# Patient Record
Sex: MALE | Race: WHITE | NOT HISPANIC OR LATINO
[De-identification: names, ages, dates, MRNs, and addresses within clinical notes are randomized per-mention and may not be internally consistent; named-entity substitution may affect disease eponyms.]

---

## 2017-09-19 ENCOUNTER — APPOINTMENT (OUTPATIENT)
Dept: PHYSICAL MEDICINE AND REHAB | Facility: CLINIC | Age: 72
End: 2017-09-19
Payer: MEDICARE

## 2017-09-19 VITALS
BODY MASS INDEX: 22.75 KG/M2 | WEIGHT: 168 LBS | OXYGEN SATURATION: 99 % | TEMPERATURE: 97.6 F | HEIGHT: 72 IN | SYSTOLIC BLOOD PRESSURE: 164 MMHG | DIASTOLIC BLOOD PRESSURE: 85 MMHG | HEART RATE: 63 BPM

## 2017-09-19 DIAGNOSIS — Z80.9 FAMILY HISTORY OF MALIGNANT NEOPLASM, UNSPECIFIED: ICD-10-CM

## 2017-09-19 PROCEDURE — 99204 OFFICE O/P NEW MOD 45 MIN: CPT

## 2017-09-21 PROBLEM — Z80.9 FAMILY HISTORY OF CANCER: Status: ACTIVE | Noted: 2017-09-21

## 2018-08-16 ENCOUNTER — APPOINTMENT (OUTPATIENT)
Dept: OTOLARYNGOLOGY | Facility: CLINIC | Age: 73
End: 2018-08-16
Payer: MEDICARE

## 2018-08-16 VITALS
HEART RATE: 76 BPM | BODY MASS INDEX: 23.03 KG/M2 | WEIGHT: 170 LBS | SYSTOLIC BLOOD PRESSURE: 142 MMHG | DIASTOLIC BLOOD PRESSURE: 82 MMHG | HEIGHT: 72 IN | TEMPERATURE: 98.2 F | OXYGEN SATURATION: 97 %

## 2018-08-16 DIAGNOSIS — H53.19 OTHER SUBJECTIVE VISUAL DISTURBANCES: ICD-10-CM

## 2018-08-16 DIAGNOSIS — Z78.9 OTHER SPECIFIED HEALTH STATUS: ICD-10-CM

## 2018-08-16 PROCEDURE — 99204 OFFICE O/P NEW MOD 45 MIN: CPT

## 2018-08-16 RX ORDER — MULTIVITAMIN
TABLET ORAL
Refills: 0 | Status: ACTIVE | COMMUNITY

## 2018-08-16 RX ORDER — MELOXICAM 7.5 MG/1
7.5 TABLET ORAL TWICE DAILY
Qty: 28 | Refills: 0 | Status: DISCONTINUED | COMMUNITY
Start: 2017-09-19 | End: 2018-08-16

## 2019-06-28 ENCOUNTER — OUTPATIENT (OUTPATIENT)
Dept: OUTPATIENT SERVICES | Facility: HOSPITAL | Age: 74
LOS: 1 days | Discharge: HOME | End: 2019-06-28

## 2019-06-28 DIAGNOSIS — D47.3 ESSENTIAL (HEMORRHAGIC) THROMBOCYTHEMIA: ICD-10-CM

## 2019-07-10 ENCOUNTER — RESULT REVIEW (OUTPATIENT)
Age: 74
End: 2019-07-10

## 2019-07-10 ENCOUNTER — APPOINTMENT (OUTPATIENT)
Dept: HEMATOLOGY ONCOLOGY | Facility: CLINIC | Age: 74
End: 2019-07-10
Payer: MEDICARE

## 2019-07-10 VITALS
BODY MASS INDEX: 21.2 KG/M2 | HEART RATE: 62 BPM | HEIGHT: 72 IN | WEIGHT: 156.53 LBS | DIASTOLIC BLOOD PRESSURE: 85 MMHG | RESPIRATION RATE: 17 BRPM | OXYGEN SATURATION: 99 % | TEMPERATURE: 97.6 F | SYSTOLIC BLOOD PRESSURE: 160 MMHG

## 2019-07-10 LAB
BASOPHILS # BLD AUTO: 0 K/UL — SIGNIFICANT CHANGE UP (ref 0–0.2)
BASOPHILS NFR BLD AUTO: 0 % — SIGNIFICANT CHANGE UP (ref 0–2)
EOSINOPHIL # BLD AUTO: 0.1 K/UL — SIGNIFICANT CHANGE UP (ref 0–0.5)
EOSINOPHIL NFR BLD AUTO: 1.2 % — SIGNIFICANT CHANGE UP (ref 0–6)
HCT VFR BLD CALC: 40.6 % — SIGNIFICANT CHANGE UP (ref 39–50)
HGB BLD-MCNC: 13.7 G/DL — SIGNIFICANT CHANGE UP (ref 13–17)
LYMPHOCYTES # BLD AUTO: 1.3 K/UL — SIGNIFICANT CHANGE UP (ref 1–3.3)
LYMPHOCYTES # BLD AUTO: 23.7 % — SIGNIFICANT CHANGE UP (ref 13–44)
MCHC RBC-ENTMCNC: 33.8 G/DL — SIGNIFICANT CHANGE UP (ref 32–36)
MCHC RBC-ENTMCNC: 35.6 PG — HIGH (ref 27–34)
MCV RBC AUTO: 105 FL — HIGH (ref 80–100)
MONOCYTES # BLD AUTO: 0.7 K/UL — SIGNIFICANT CHANGE UP (ref 0–0.9)
MONOCYTES NFR BLD AUTO: 13.8 % — SIGNIFICANT CHANGE UP (ref 2–14)
NEUTROPHILS # BLD AUTO: 3.3 K/UL — SIGNIFICANT CHANGE UP (ref 1.8–7.4)
NEUTROPHILS NFR BLD AUTO: 61.4 % — SIGNIFICANT CHANGE UP (ref 43–77)
PLATELET # BLD AUTO: 528 K/UL — HIGH (ref 150–400)
RBC # BLD: 3.85 M/UL — LOW (ref 4.2–5.8)
RBC # FLD: 24.1 % — HIGH (ref 10.3–14.5)
WBC # BLD: 5.3 K/UL — SIGNIFICANT CHANGE UP (ref 3.8–10.5)
WBC # FLD AUTO: 5.3 K/UL — SIGNIFICANT CHANGE UP (ref 3.8–10.5)

## 2019-07-10 PROCEDURE — 99204 OFFICE O/P NEW MOD 45 MIN: CPT

## 2019-07-12 RX ORDER — ATORVASTATIN CALCIUM 40 MG/1
40 TABLET, FILM COATED ORAL
Qty: 1 | Refills: 1 | Status: ACTIVE | COMMUNITY
Start: 2019-07-12

## 2019-07-12 NOTE — REVIEW OF SYSTEMS
[Negative] : Heme/Lymph [FreeTextEntry5] : Chest paisn now resolved since May.  [de-identified] : rash almost gone/

## 2019-07-12 NOTE — ASSESSMENT
[FreeTextEntry1] : This is a 73 year old man with a history of CAD recent stent placement at [facility]. On that hospital stay platelet count found to be over 1,000,000.  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  Patient now feeling well. Dose of the Hydroxyuria was being adjusted a lot lately (has only been on this for 2 months).  Most recent platelet count at [facility] this past week was 430,000 dose increased to 4 tablets on weekdays, 1 tablet on weekends.  He started this just yesterday.  Platelet count 528,000 today. Recommend he keep the current schedule, there was not enough time on new treatment dose to have made an impact yet. Recheck in a week.  \par \par Informed patient etiology and pathophysiology behind the ANNA 2 mutaiton. We discussed the risk of this progressing into myelofibrosis and AML.  Explained that the purpose of the hydrea is to limit the thrombotic risk fro the platelet elevation, does not prevent the progression into a more insidious state.  \par \par Spent > 45 minutes in direct patient care and and addressed all questions and concerns.  >50% of this time was in direct face to face contact with patient during exam and counseling.

## 2019-07-12 NOTE — HISTORY OF PRESENT ILLNESS
[de-identified] : This is a 73 year old man with a history of ANNA 2 positive ET.  Patient was being treated with Hydroxyuria 2 500mg initially tablets daily starting 2 months ago by Hematology Dr. Villanueva at [facility]. May 3 until May 18.  Has been treated with hydroxyuria for about 2 months.  The dose had been changed quite a few times first because dose was too high and platelets down in the 100,000 range, more recently platelets too high and most recent increase was to  weekdays 2 tablets BId and then 1 tablet on the weekends. . \par He does describe a erythematous rash Little bit of a rash that went away shortly after treatment.  During this hospital stay at [facility] may 2 stents placed.  Hx of stroke while having the cardiac cath. \par hx of adhesive allergy to bandages.

## 2019-07-25 ENCOUNTER — TRANSCRIPTION ENCOUNTER (OUTPATIENT)
Age: 74
End: 2019-07-25

## 2019-08-07 ENCOUNTER — OUTPATIENT (OUTPATIENT)
Dept: OUTPATIENT SERVICES | Facility: HOSPITAL | Age: 74
LOS: 1 days | Discharge: HOME | End: 2019-08-07

## 2019-08-07 DIAGNOSIS — D47.3 ESSENTIAL (HEMORRHAGIC) THROMBOCYTHEMIA: ICD-10-CM

## 2019-08-09 ENCOUNTER — APPOINTMENT (OUTPATIENT)
Dept: HEMATOLOGY ONCOLOGY | Facility: CLINIC | Age: 74
End: 2019-08-09

## 2019-09-18 ENCOUNTER — APPOINTMENT (OUTPATIENT)
Dept: FAMILY MEDICINE | Facility: CLINIC | Age: 74
End: 2019-09-18
Payer: MEDICARE

## 2019-09-18 VITALS
TEMPERATURE: 98 F | WEIGHT: 164.19 LBS | DIASTOLIC BLOOD PRESSURE: 72 MMHG | HEIGHT: 72 IN | BODY MASS INDEX: 22.24 KG/M2 | OXYGEN SATURATION: 99 % | SYSTOLIC BLOOD PRESSURE: 110 MMHG | RESPIRATION RATE: 14 BRPM | HEART RATE: 64 BPM

## 2019-09-18 DIAGNOSIS — Z92.89 PERSONAL HISTORY OF OTHER MEDICAL TREATMENT: ICD-10-CM

## 2019-09-18 DIAGNOSIS — Z87.898 PERSONAL HISTORY OF OTHER SPECIFIED CONDITIONS: ICD-10-CM

## 2019-09-18 DIAGNOSIS — Z80.3 FAMILY HISTORY OF MALIGNANT NEOPLASM OF BREAST: ICD-10-CM

## 2019-09-18 DIAGNOSIS — Z86.73 PERSONAL HISTORY OF TRANSIENT ISCHEMIC ATTACK (TIA), AND CEREBRAL INFARCTION W/OUT RESIDUAL DEFICITS: ICD-10-CM

## 2019-09-18 DIAGNOSIS — Z80.42 FAMILY HISTORY OF MALIGNANT NEOPLASM OF PROSTATE: ICD-10-CM

## 2019-09-18 DIAGNOSIS — Z80.8 FAMILY HISTORY OF MALIGNANT NEOPLASM OF OTHER ORGANS OR SYSTEMS: ICD-10-CM

## 2019-09-18 DIAGNOSIS — I25.2 OLD MYOCARDIAL INFARCTION: ICD-10-CM

## 2019-09-18 PROCEDURE — 99204 OFFICE O/P NEW MOD 45 MIN: CPT

## 2019-09-18 NOTE — PHYSICAL EXAM
[No Acute Distress] : no acute distress [Well Nourished] : well nourished [Well-Appearing] : well-appearing [Well Developed] : well developed [Normal Voice/Communication] : normal voice/communication [Normal Sclera/Conjunctiva] : normal sclera/conjunctiva [EOMI] : extraocular movements intact [Normal Outer Ear/Nose] : the outer ears and nose were normal in appearance [No JVD] : no jugular venous distention [Thyroid Normal, No Nodules] : the thyroid was normal and there were no nodules present [No Accessory Muscle Use] : no accessory muscle use [No Respiratory Distress] : no respiratory distress  [Clear to Auscultation] : lungs were clear to auscultation bilaterally [Normal Rate] : normal rate  [Normal S1, S2] : normal S1 and S2 [Regular Rhythm] : with a regular rhythm [Pedal Pulses Present] : the pedal pulses are present [No Edema] : there was no peripheral edema [No Extremity Clubbing/Cyanosis] : no extremity clubbing/cyanosis [No Masses] : no abdominal mass palpated [Normal Supraclavicular Nodes] : no supraclavicular lymphadenopathy [Normal Anterior Cervical Nodes] : no anterior cervical lymphadenopathy [Grossly Normal Strength/Tone] : grossly normal strength/tone [Coordination Grossly Intact] : coordination grossly intact [No Focal Deficits] : no focal deficits [Normal Gait] : normal gait [Speech Grossly Normal] : speech grossly normal [Memory Grossly Normal] : memory grossly normal [Normal Affect] : the affect was normal [Alert and Oriented x3] : oriented to person, place, and time [Normal Mood] : the mood was normal [Normal Insight/Judgement] : insight and judgment were intact

## 2019-09-18 NOTE — HISTORY OF PRESENT ILLNESS
[FreeTextEntry8] : Patient presents for initial visit. Feels well, +PMH of thrombocytosis. Colonoscopy referral discussed today, patient declines at this time. PSA was tested 2 weeks ago, was 0.2, patient declines retesting in blood work ordered today. \par \par Dr. Aragon cardio at [place]\par Dr. 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

## 2019-09-20 ENCOUNTER — APPOINTMENT (OUTPATIENT)
Dept: FAMILY MEDICINE | Facility: CLINIC | Age: 74
End: 2019-09-20
Payer: MEDICARE

## 2019-09-20 VITALS
SYSTOLIC BLOOD PRESSURE: 122 MMHG | HEART RATE: 61 BPM | WEIGHT: 164 LBS | TEMPERATURE: 98.1 F | DIASTOLIC BLOOD PRESSURE: 68 MMHG | OXYGEN SATURATION: 100 % | RESPIRATION RATE: 16 BRPM | BODY MASS INDEX: 22.21 KG/M2 | HEIGHT: 72 IN

## 2019-09-20 DIAGNOSIS — S90.859A SUPERFICIAL FOREIGN BODY, UNSPECIFIED FOOT, INITIAL ENCOUNTER: ICD-10-CM

## 2019-09-20 PROCEDURE — 99214 OFFICE O/P EST MOD 30 MIN: CPT

## 2019-09-20 NOTE — ASSESSMENT
[FreeTextEntry1] : splinter removed with tweezers in office, small amount of purulent drainage, site cleansed with alcohol prep pad prior to extraction and cleaned and dressed with triple antibiotic ointment and band aid. \par keep clean and covered with antibiotic ointment and band aid until closed\par Return to office if you feel worse or do not feel better\par

## 2019-09-20 NOTE — PHYSICAL EXAM
[No Acute Distress] : no acute distress [Well Nourished] : well nourished [Well Developed] : well developed [Normal Voice/Communication] : normal voice/communication [Well-Appearing] : well-appearing [Normal Sclera/Conjunctiva] : normal sclera/conjunctiva [Normal Outer Ear/Nose] : the outer ears and nose were normal in appearance [de-identified] : left foot: slight bluish discoloration of distal foot/toes ?Raynaud's phenomenon: splinter removed, small amount of pus expressed, no pain, no bleeding

## 2019-09-20 NOTE — HISTORY OF PRESENT ILLNESS
[FreeTextEntry8] : Patient presents c/o ?splinter in left foot on plantar surface just below second metatarsal. x 1 day.  Painful with walking a little, no fever, no redness. \par \par

## 2019-10-04 ENCOUNTER — MOBILE ON CALL (OUTPATIENT)
Age: 74
End: 2019-10-04

## 2019-10-07 ENCOUNTER — APPOINTMENT (OUTPATIENT)
Dept: FAMILY MEDICINE | Facility: CLINIC | Age: 74
End: 2019-10-07
Payer: MEDICARE

## 2019-10-07 VITALS
DIASTOLIC BLOOD PRESSURE: 72 MMHG | TEMPERATURE: 98.5 F | OXYGEN SATURATION: 99 % | RESPIRATION RATE: 14 BRPM | BODY MASS INDEX: 21.55 KG/M2 | WEIGHT: 159.13 LBS | SYSTOLIC BLOOD PRESSURE: 104 MMHG | HEIGHT: 72 IN | HEART RATE: 67 BPM

## 2019-10-07 DIAGNOSIS — J06.9 ACUTE UPPER RESPIRATORY INFECTION, UNSPECIFIED: ICD-10-CM

## 2019-10-07 PROCEDURE — 99214 OFFICE O/P EST MOD 30 MIN: CPT

## 2019-10-10 ENCOUNTER — APPOINTMENT (OUTPATIENT)
Dept: OTOLARYNGOLOGY | Facility: CLINIC | Age: 74
End: 2019-10-10
Payer: MEDICARE

## 2019-10-10 VITALS
HEIGHT: 72 IN | WEIGHT: 164 LBS | OXYGEN SATURATION: 98 % | TEMPERATURE: 97.5 F | BODY MASS INDEX: 22.21 KG/M2 | SYSTOLIC BLOOD PRESSURE: 112 MMHG | DIASTOLIC BLOOD PRESSURE: 74 MMHG | HEART RATE: 69 BPM

## 2019-10-10 DIAGNOSIS — H91.90 UNSPECIFIED HEARING LOSS, UNSPECIFIED EAR: ICD-10-CM

## 2019-10-10 DIAGNOSIS — Z15.89 GENETIC SUSCEPTIBILITY TO OTHER DISEASE: ICD-10-CM

## 2019-10-10 DIAGNOSIS — H93.229: ICD-10-CM

## 2019-10-10 PROCEDURE — 99213 OFFICE O/P EST LOW 20 MIN: CPT

## 2019-10-10 RX ORDER — TRIAMCINOLONE ACETONIDE 1 MG/G
0.1 CREAM TOPICAL
Qty: 454 | Refills: 0 | Status: COMPLETED | COMMUNITY
Start: 2019-08-05

## 2019-10-10 RX ORDER — AMLODIPINE BESYLATE 5 MG/1
5 TABLET ORAL
Refills: 0 | Status: DISCONTINUED | COMMUNITY
End: 2019-10-10

## 2019-10-10 RX ORDER — PEDI NUTRITION,MILK BASED,IRON 0.03 G-0.6
LIQUID (ML) ORAL
Refills: 0 | Status: DISCONTINUED | COMMUNITY
End: 2019-10-10

## 2019-10-10 RX ORDER — ASPIRIN 325 MG/1
325 TABLET, COATED ORAL DAILY
Qty: 90 | Refills: 3 | Status: DISCONTINUED | COMMUNITY
Start: 2019-07-12 | End: 2019-10-10

## 2019-10-10 RX ORDER — ASPIRIN ENTERIC COATED TABLETS 81 MG 81 MG/1
81 TABLET, DELAYED RELEASE ORAL
Refills: 0 | Status: ACTIVE | COMMUNITY

## 2019-10-10 RX ORDER — CARVEDILOL 3.12 MG/1
3.12 TABLET, FILM COATED ORAL
Qty: 180 | Refills: 2 | Status: COMPLETED | COMMUNITY
Start: 2019-07-12 | End: 2019-10-10

## 2019-10-10 RX ORDER — PANTOPRAZOLE 40 MG/1
40 TABLET, DELAYED RELEASE ORAL
Qty: 30 | Refills: 0 | Status: COMPLETED | COMMUNITY
Start: 2019-05-18

## 2019-10-10 RX ORDER — RAMIPRIL 2.5 MG/1
2.5 CAPSULE ORAL
Qty: 90 | Refills: 0 | Status: COMPLETED | COMMUNITY
Start: 2019-09-12

## 2019-10-10 RX ORDER — UBIDECARENONE/VIT E ACET 100MG-5
CAPSULE ORAL
Refills: 0 | Status: DISCONTINUED | COMMUNITY
End: 2019-10-10

## 2019-10-10 NOTE — PHYSICAL EXAM
[Midline] : trachea located in midline position [Normal] : no rashes [] : [place]-Hallpike test is negative

## 2019-10-10 NOTE — HISTORY OF PRESENT ILLNESS
[de-identified] : 73 y/o M who continues to have an echo sensation in both ears when he exercises, worse in the right ear.

## 2019-10-10 NOTE — CONSULT LETTER
[Courtesy Letter:] : I had the pleasure of seeing your patient, [unfilled], in my office today. [Dear  ___] : Dear  [unfilled], [Please see my note below.] : Please see my note below. [Consult Closing:] : Thank you very much for allowing me to participate in the care of this patient.  If you have any questions, please do not hesitate to contact me. [Sincerely,] : Sincerely, [FreeTextEntry3] : Camden Boone MD, FACS\par Clinical \par Dept. of Otolaryngology\par [facility]\par  [FreeTextEntry2] : Joey Santana MD

## 2019-10-19 NOTE — ASSESSMENT
[FreeTextEntry1] : rest, plenty of fluids\par supportive care\par flonase, claritin if needed\par Return to office if you feel worse or do not feel better\par

## 2019-10-19 NOTE — HISTORY OF PRESENT ILLNESS
[FreeTextEntry8] : Since Thursday, dry cough. Runny nose, dizziness when got up quickly, achy, chills +hot and cold. No sick contacts. No n/v/d. No fever measured at home. Feels slightly better every day since then. Thought was better, then last evening felt worse again.  Low appetite past few days, "came back" today. Normal food tolerated today. \par \par \par ROS: negative except as noted above\par \par

## 2019-10-19 NOTE — PHYSICAL EXAM
[No Acute Distress] : no acute distress [Well Nourished] : well nourished [Well-Appearing] : well-appearing [Well Developed] : well developed [Normal Voice/Communication] : normal voice/communication [Normal TMs] : both tympanic membranes were normal [Normal Outer Ear/Nose] : the outer ears and nose were normal in appearance [Normal Sclera/Conjunctiva] : normal sclera/conjunctiva [No JVD] : no jugular venous distention [No Respiratory Distress] : no respiratory distress  [No Accessory Muscle Use] : no accessory muscle use [Clear to Auscultation] : lungs were clear to auscultation bilaterally [Normal Rate] : normal rate  [Regular Rhythm] : with a regular rhythm [Normal S1, S2] : normal S1 and S2 [Normal Supraclavicular Nodes] : no supraclavicular lymphadenopathy [Normal Anterior Cervical Nodes] : no anterior cervical lymphadenopathy [Coordination Grossly Intact] : coordination grossly intact [No Rash] : no rash [Normal Gait] : normal gait [No Focal Deficits] : no focal deficits [Speech Grossly Normal] : speech grossly normal [Normal Affect] : the affect was normal [Memory Grossly Normal] : memory grossly normal [Normal Mood] : the mood was normal [Alert and Oriented x3] : oriented to person, place, and time [Normal Insight/Judgement] : insight and judgment were intact [de-identified] : sl erythema o/p, no tonsillar exudates

## 2019-11-05 ENCOUNTER — APPOINTMENT (OUTPATIENT)
Dept: OTOLARYNGOLOGY | Facility: CLINIC | Age: 74
End: 2019-11-05

## 2019-11-26 ENCOUNTER — RESULT REVIEW (OUTPATIENT)
Age: 74
End: 2019-11-26

## 2020-01-03 ENCOUNTER — APPOINTMENT (OUTPATIENT)
Dept: OTOLARYNGOLOGY | Facility: CLINIC | Age: 75
End: 2020-01-03
Payer: MEDICARE

## 2020-01-03 DIAGNOSIS — H93.293 OTHER ABNORMAL AUDITORY PERCEPTIONS, BILATERAL: ICD-10-CM

## 2020-01-03 DIAGNOSIS — H90.3 SENSORINEURAL HEARING LOSS, BILATERAL: ICD-10-CM

## 2020-01-03 PROCEDURE — 99214 OFFICE O/P EST MOD 30 MIN: CPT | Mod: 25

## 2020-01-03 PROCEDURE — 92504 EAR MICROSCOPY EXAMINATION: CPT

## 2020-01-03 PROCEDURE — 92567 TYMPANOMETRY: CPT | Mod: 22

## 2020-01-03 NOTE — PHYSICAL EXAM
[Normal] : no rashes [de-identified] : no visible respirations on deep inspiration when supine or upright

## 2020-01-03 NOTE — HISTORY OF PRESENT ILLNESS
[de-identified] : 74 year old male internal referral by Dr. Camden Boone, ENT for echoing. 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 Usually right ear but sometimes the left ear.  Reports bilateral ear echo worse in the right ear. Reports it goes away when laying down. Reports echoing began about 3 years ago, is intermittent, but seems to be occurring more often. Initially would start if he was outside with strong winds, now it occurs at random. Reports intermittent bilateral ears tinnitus, non fluctuating, described as high pitched, noticed when he became an . Denies otalgia, otorrhea, ear infections. Denies hearing loss. Denies use of hearing aids.  Lost about 10 pounds last spring while in hospital but has since gained it back, symptoms present before then.  No prior ear surgeries.  Takes meds for BP/CAD, sometimes gets dizziness for this.  , retired since age 60.  Previously worked for [employer].  No ETD problems when flying.

## 2020-01-03 NOTE — PROCEDURE
[FreeTextEntry3] : Procedure note:  Binocular microscopy\par \par Jan 03, 2020 \par \par Inspection of the ears was performed under binocular microscopy because of need to accurately visualize otologic landmarks and potential pathologic conditions that would not otherwise be visible through simple otoscopic exam.\par

## 2020-01-03 NOTE — DATA REVIEWED
[de-identified] : I personally reviewed the patient's audiogram, which shows no change in compliance on long-time tympanometry with deep inspiration.\par

## 2020-01-03 NOTE — REASON FOR VISIT
[Initial Consultation] : an initial consultation for [Other: _____] : [unfilled] [FreeTextEntry2] : internal referral by Dr. Camden Boone, ENT for autophony

## 2020-03-04 ENCOUNTER — APPOINTMENT (OUTPATIENT)
Dept: FAMILY MEDICINE | Facility: CLINIC | Age: 75
End: 2020-03-04
Payer: MEDICARE

## 2020-03-04 VITALS
HEART RATE: 59 BPM | BODY MASS INDEX: 22.53 KG/M2 | SYSTOLIC BLOOD PRESSURE: 100 MMHG | HEIGHT: 72 IN | OXYGEN SATURATION: 99 % | WEIGHT: 166.31 LBS | DIASTOLIC BLOOD PRESSURE: 70 MMHG | RESPIRATION RATE: 16 BRPM

## 2020-03-04 PROCEDURE — 99214 OFFICE O/P EST MOD 30 MIN: CPT

## 2020-03-04 NOTE — ASSESSMENT
[FreeTextEntry1] : autophony - flonase x 2-4 weeks twice daily\par Return to office if you feel worse or do not feel better\par RTO 1 month for f/u with me will discuss CT

## 2020-03-04 NOTE — PHYSICAL EXAM
[No Acute Distress] : no acute distress [Well Nourished] : well nourished [Well Developed] : well developed [Well-Appearing] : well-appearing [Normal Voice/Communication] : normal voice/communication [Normal Sclera/Conjunctiva] : normal sclera/conjunctiva [PERRL] : pupils equal round and reactive to light [Normal Outer Ear/Nose] : the outer ears and nose were normal in appearance [Normal Oropharynx] : the oropharynx was normal [Normal TMs] : both tympanic membranes were normal [No JVD] : no jugular venous distention [No Lymphadenopathy] : no lymphadenopathy [Supple] : supple [No Respiratory Distress] : no respiratory distress  [No Accessory Muscle Use] : no accessory muscle use [Clear to Auscultation] : lungs were clear to auscultation bilaterally [Normal Rate] : normal rate  [Regular Rhythm] : with a regular rhythm [Normal S1, S2] : normal S1 and S2 [Normal Supraclavicular Nodes] : no supraclavicular lymphadenopathy [Normal Anterior Cervical Nodes] : no anterior cervical lymphadenopathy [Coordination Grossly Intact] : coordination grossly intact [No Focal Deficits] : no focal deficits [Normal Gait] : normal gait [Speech Grossly Normal] : speech grossly normal [Memory Grossly Normal] : memory grossly normal [Normal Affect] : the affect was normal [Alert and Oriented x3] : oriented to person, place, and time [Normal Mood] : the mood was normal [Normal Insight/Judgement] : insight and judgment were intact [de-identified] : nasal turbinates larger on right than left

## 2020-03-04 NOTE — HISTORY OF PRESENT ILLNESS
[FreeTextEntry8] : Patient presents c/o 1.5 years of an echo sound in right ear, sometimes in left ear, notices it especially at night. No ear pain. +h/o thrombocytopenia. + h/o prostate cancer, declines prostate blood work. today, states had recent testing that was within desired range as per his oncologist. Did see ENT for this complaint recently.\par \par ROS: negative except as noted above\par

## 2020-06-29 ENCOUNTER — APPOINTMENT (OUTPATIENT)
Dept: FAMILY MEDICINE | Facility: CLINIC | Age: 75
End: 2020-06-29
Payer: MEDICARE

## 2020-06-29 VITALS
BODY MASS INDEX: 22.24 KG/M2 | DIASTOLIC BLOOD PRESSURE: 70 MMHG | TEMPERATURE: 97.8 F | WEIGHT: 164 LBS | OXYGEN SATURATION: 99 % | SYSTOLIC BLOOD PRESSURE: 110 MMHG | HEART RATE: 60 BPM | RESPIRATION RATE: 14 BRPM

## 2020-06-29 DIAGNOSIS — D17.9 BENIGN LIPOMATOUS NEOPLASM, UNSPECIFIED: ICD-10-CM

## 2020-06-29 PROCEDURE — 99214 OFFICE O/P EST MOD 30 MIN: CPT

## 2020-06-29 NOTE — HISTORY OF PRESENT ILLNESS
[FreeTextEntry1] : Saw derm last week, had left shoulder skin-superficial melanoma. Left shoulder small lipoma, not painful. Just noticed it the other day. Headaches occasionally mild, do not wake him at night. Running for exercise and stress management, but prefers to go to gym due to weight loss with running. \par \par ROS: negative except as noted above\par  \par \par

## 2020-06-29 NOTE — PHYSICAL EXAM
[Well Nourished] : well nourished [No Acute Distress] : no acute distress [Well Developed] : well developed [Well-Appearing] : well-appearing [Normal Voice/Communication] : normal voice/communication [Normal Sclera/Conjunctiva] : normal sclera/conjunctiva [Normal Outer Ear/Nose] : the outer ears and nose were normal in appearance [No Respiratory Distress] : no respiratory distress  [Clear to Auscultation] : lungs were clear to auscultation bilaterally [No Accessory Muscle Use] : no accessory muscle use [Regular Rhythm] : with a regular rhythm [Normal Rate] : normal rate  [Normal S1, S2] : normal S1 and S2 [Normal Anterior Cervical Nodes] : no anterior cervical lymphadenopathy [Normal Supraclavicular Nodes] : no supraclavicular lymphadenopathy [Speech Grossly Normal] : speech grossly normal [Memory Grossly Normal] : memory grossly normal [Alert and Oriented x3] : oriented to person, place, and time [Normal Mood] : the mood was normal [Normal Affect] : the affect was normal [Normal Insight/Judgement] : insight and judgment were intact

## 2020-10-26 ENCOUNTER — RX RENEWAL (OUTPATIENT)
Age: 75
End: 2020-10-26

## 2020-10-30 ENCOUNTER — APPOINTMENT (OUTPATIENT)
Dept: FAMILY MEDICINE | Facility: CLINIC | Age: 75
End: 2020-10-30
Payer: MEDICARE

## 2020-10-30 VITALS
SYSTOLIC BLOOD PRESSURE: 126 MMHG | HEART RATE: 60 BPM | OXYGEN SATURATION: 99 % | TEMPERATURE: 96.6 F | BODY MASS INDEX: 22.79 KG/M2 | WEIGHT: 168 LBS | DIASTOLIC BLOOD PRESSURE: 82 MMHG | RESPIRATION RATE: 14 BRPM

## 2020-10-30 PROCEDURE — 99214 OFFICE O/P EST MOD 30 MIN: CPT

## 2020-10-30 PROCEDURE — 99072 ADDL SUPL MATRL&STAF TM PHE: CPT

## 2020-10-30 NOTE — PHYSICAL EXAM
[No Acute Distress] : no acute distress [Well Nourished] : well nourished [Well Developed] : well developed [Well-Appearing] : well-appearing [Normal Voice/Communication] : normal voice/communication [Normal Sclera/Conjunctiva] : normal sclera/conjunctiva [Normal Outer Ear/Nose] : the outer ears and nose were normal in appearance [No Lymphadenopathy] : no lymphadenopathy [Supple] : supple [No Respiratory Distress] : no respiratory distress  [No Accessory Muscle Use] : no accessory muscle use [Clear to Auscultation] : lungs were clear to auscultation bilaterally [Normal Rate] : normal rate  [Regular Rhythm] : with a regular rhythm [Normal S1, S2] : normal S1 and S2 [Speech Grossly Normal] : speech grossly normal [Memory Grossly Normal] : memory grossly normal [Normal Affect] : the affect was normal [Alert and Oriented x3] : oriented to person, place, and time [Normal Mood] : the mood was normal [Normal Insight/Judgement] : insight and judgment were intact

## 2020-11-02 DIAGNOSIS — Z00.00 ENCOUNTER FOR GENERAL ADULT MEDICAL EXAMINATION W/OUT ABNORMAL FINDINGS: ICD-10-CM

## 2020-11-02 RX ORDER — CARVEDILOL 25 MG/1
TABLET, FILM COATED ORAL
Refills: 0 | Status: COMPLETED | COMMUNITY
End: 2020-11-02

## 2020-11-02 RX ORDER — HYDROXYUREA 500 MG/1
CAPSULE ORAL
Refills: 0 | Status: COMPLETED | COMMUNITY
End: 2020-11-02

## 2020-11-02 RX ORDER — CANDESARTAN CILEXETIL 4 MG/1
4 TABLET ORAL
Qty: 90 | Refills: 0 | Status: COMPLETED | COMMUNITY
Start: 2019-10-03 | End: 2020-11-02

## 2020-11-02 RX ORDER — TICAGRELOR 90 MG/1
90 TABLET ORAL TWICE DAILY
Refills: 0 | Status: DISCONTINUED | COMMUNITY
Start: 2019-07-12 | End: 2020-11-02

## 2020-11-02 RX ORDER — CARVEDILOL 3.12 MG/1
3.12 TABLET, FILM COATED ORAL TWICE DAILY
Qty: 60 | Refills: 0 | Status: ACTIVE | COMMUNITY
Start: 2020-11-02

## 2020-11-02 RX ORDER — CANDESARTAN CILEXETIL 4 MG/1
4 TABLET ORAL AT BEDTIME
Qty: 30 | Refills: 0 | Status: ACTIVE | COMMUNITY
Start: 2020-11-02

## 2020-11-02 NOTE — HISTORY OF PRESENT ILLNESS
[FreeTextEntry1] : Dr. Villanueva hematology /oncology, affiliated with [facility]. Also cardiologist Dr. Lynch. Needs reports. Started going to the gym again. Feels well. NO headaches, no blurry vision, no epistaxis. Lower back pain, acupuncture helps. Better after moves around in the morning. \par \par ROS: negative except as noted above\par

## 2020-11-02 NOTE — ASSESSMENT
[FreeTextEntry1] : declines blood work\par last colonoscopy few polyps, declines further colonoscopy \par no change in bowel habits, no black or bloody stool, no family hx colon cancer\par states had psa 6 months ago, normal \par I d/w patient that I need records from other physicians to review blood work, cancer screenings etc. or would be best to repeat those tests with me. He declines blood work at this time\par RBA of not doing cancer screening tests discussed, need records please advised\par Return for CPE in 3 months and prn

## 2020-11-19 ENCOUNTER — RX CHANGE (OUTPATIENT)
Age: 75
End: 2020-11-19

## 2020-11-23 ENCOUNTER — RX CHANGE (OUTPATIENT)
Age: 75
End: 2020-11-23

## 2020-11-24 ENCOUNTER — RX CHANGE (OUTPATIENT)
Age: 75
End: 2020-11-24

## 2020-12-18 RX ORDER — HYDROXYUREA 500 MG/1
500 CAPSULE ORAL DAILY
Qty: 60 | Refills: 0 | Status: ACTIVE | COMMUNITY
Start: 2020-11-02 | End: 1900-01-01

## 2020-12-21 PROBLEM — J06.9 VIRAL URI: Status: RESOLVED | Noted: 2019-10-19 | Resolved: 2020-12-21

## 2020-12-30 ENCOUNTER — APPOINTMENT (OUTPATIENT)
Dept: FAMILY MEDICINE | Facility: CLINIC | Age: 75
End: 2020-12-30
Payer: MEDICARE

## 2020-12-30 VITALS
OXYGEN SATURATION: 99 % | BODY MASS INDEX: 23.03 KG/M2 | DIASTOLIC BLOOD PRESSURE: 92 MMHG | RESPIRATION RATE: 15 BRPM | WEIGHT: 170 LBS | HEIGHT: 72 IN | TEMPERATURE: 96.8 F | HEART RATE: 72 BPM | SYSTOLIC BLOOD PRESSURE: 150 MMHG

## 2020-12-30 VITALS — DIASTOLIC BLOOD PRESSURE: 80 MMHG | SYSTOLIC BLOOD PRESSURE: 126 MMHG

## 2020-12-30 PROCEDURE — 99072 ADDL SUPL MATRL&STAF TM PHE: CPT

## 2020-12-30 PROCEDURE — 99214 OFFICE O/P EST MOD 30 MIN: CPT

## 2021-01-05 NOTE — PHYSICAL EXAM
[No Acute Distress] : no acute distress [Well Nourished] : well nourished [Well Developed] : well developed [Well-Appearing] : well-appearing [Normal Voice/Communication] : normal voice/communication [Normal Sclera/Conjunctiva] : normal sclera/conjunctiva [Normal Outer Ear/Nose] : the outer ears and nose were normal in appearance [Normal Oropharynx] : the oropharynx was normal [No Respiratory Distress] : no respiratory distress  [No Accessory Muscle Use] : no accessory muscle use [Clear to Auscultation] : lungs were clear to auscultation bilaterally [Normal Rate] : normal rate  [Regular Rhythm] : with a regular rhythm [Normal S1, S2] : normal S1 and S2 [Speech Grossly Normal] : speech grossly normal [Memory Grossly Normal] : memory grossly normal [Normal Affect] : the affect was normal [Alert and Oriented x3] : oriented to person, place, and time [Normal Mood] : the mood was normal [Normal Insight/Judgement] : insight and judgment were intact

## 2021-01-18 NOTE — HISTORY OF PRESENT ILLNESS
[FreeTextEntry1] : Presents for f/u visit. Went running in November, had CP, seen at [facility]. Saw Dr. Lawson afterwards, his cardiologist. Nuclear stress test normal. Has been running since, no problems. Running a little slower since then. Today ran fast without problems however.  \par \par ROS: negative except as noted above\par  Xenograft Text: The defect edges were debeveled with a #15 scalpel blade.  Given the location of the defect, shape of the defect and the proximity to free margins a xenograft was deemed most appropriate.  The graft was then trimmed to fit the size of the defect.  The graft was then placed in the primary defect and oriented appropriately.

## 2021-02-24 ENCOUNTER — APPOINTMENT (OUTPATIENT)
Dept: FAMILY MEDICINE | Facility: CLINIC | Age: 76
End: 2021-02-24
Payer: MEDICARE

## 2021-02-24 VITALS
BODY MASS INDEX: 23.73 KG/M2 | TEMPERATURE: 97.3 F | SYSTOLIC BLOOD PRESSURE: 158 MMHG | DIASTOLIC BLOOD PRESSURE: 90 MMHG | RESPIRATION RATE: 16 BRPM | WEIGHT: 175 LBS | OXYGEN SATURATION: 98 % | HEART RATE: 74 BPM

## 2021-02-24 DIAGNOSIS — T14.8XXA OTHER INJURY OF UNSPECIFIED BODY REGION, INITIAL ENCOUNTER: ICD-10-CM

## 2021-02-24 PROCEDURE — 99214 OFFICE O/P EST MOD 30 MIN: CPT

## 2021-02-24 PROCEDURE — 99072 ADDL SUPL MATRL&STAF TM PHE: CPT

## 2021-02-25 ENCOUNTER — NON-APPOINTMENT (OUTPATIENT)
Age: 76
End: 2021-02-25

## 2021-03-01 ENCOUNTER — APPOINTMENT (OUTPATIENT)
Dept: ORTHOPEDIC SURGERY | Facility: CLINIC | Age: 76
End: 2021-03-01

## 2021-03-01 PROBLEM — T14.8XXA MUSCLE STRAIN: Status: ACTIVE | Noted: 2021-03-01

## 2021-03-01 NOTE — PHYSICAL EXAM
[facility]   Intensive Care Unit Daily Note    Name: Abdi Sanchez  (Baby2 Sara Sanchez)  Parents: Sara and Renny SANCHEZ  YOB: 2018    History of Present Illness    AGA female infant born at 1800 grams and 34 1/7 wks PMA by , Low Transverse due to maternal complete placenta previa.   Infant admitted directly to the NICU for evaluation and management of prematurity.    Patient Active Problem List   Diagnosis     Prematurity, 1,750-1,999 grams, 33-34 completed weeks     Malnutrition (H),  birth requiring IVF     Dichorionic diamniotic twin gestation      Interval History   No acute concerns overnight. Still only transferring minimal volumes by breast feeding.        Assessment & Plan   Overall Status:  9 day old  LBW female infant who is now 35w3d PMA.   This patient, whose weight is < 5000 grams, is not critically ill. She still requires gavage feeds and CR monitoring, due to prematurity.    FEN:    Vitals:    18 2130 18 2130 18   Weight: 1.75 kg (3 lb 13.7 oz) 1.74 kg (3 lb 13.4 oz) 1.79 kg (3 lb 15.1 oz)   Weight change: 0.05 kg (1.8 oz)  -1% change from BW    Malnutrition.   Appropriate I/O, ~ at fluid goal with adequate UO and stool. <10%po   Feeding readiness scores appropriate ~50% of the time - BF x4 for small volumes.    Continue;  - TF goal to 140 ml/kg/day.   - po/gavage feeds w MBM/DBM + 22HMF on IDF plan   - encouraging breast feeding. Starting bottle attempts 2018 in discussion with mother.  - vitamin D and fortification per dietician's recs - see note.  - monitoring fluid status and overall growth.     Respiratory:  No distress, in RA.   - Continue routine CR monitoring.    Apnea of Prematurity:  No ABDS. Not on caffeine, given BGA 34w1d.    Cardiovascular:  Good BP and perfusion. No murmur.   Cardiac ECHO : PFO o/w normal.   (Prenatal Echocardiogram was done and was an  inconclusive study due to fetal postioning. Cardiology recommended a f/u ECHO after birth)  - Continue routine CR monitoring.    ID:  No current signs of systemic infection. Initial sepsis eval NTD and never rec'd antibiotics.    Hematology:  No Anemia   - assess need for iron supplementation at 2 weeks of age, with full feeds, per dietician's recs.  - repeat Hgb with ferritin with blood draws for repeat NMS at 14 and 30do.   No results for input(s): HGB in the last 168 hours.    Hyperbilirubinemia: At risk due to prematurity. MBT O-, antibody neg. BBT O+, antibody neg.  s/p phototherapy, f/u TSB shows downward trend    CNS:  No concerns. Exam wnl.      [place]CM[place]1 nl/neg.  - Send repeat NMS at 14 & 30 days old.  - Obtain hearing/CCHD screens PTD.  - Obtain carseat trial PTD.  - Continue standard NICU cares and family education plan.    Immunizations   BW too low for Hep B immunization at <24 hr.  - give Hep B immunization at 21-30 days old or PTD, whichever comes first if parents consent.  There is no immunization history for the selected administration types on file for this patient.     Medications   Current Facility-Administered Medications   Medication     breast milk for bar code scanning verification 1 Bottle     cholecalciferol (vitamin D/D-VI-SOL) liquid 200 Units     [START ON 2018] hepatitis b vaccine recombinant (ENGERIX-B) injection 10 mcg     sucrose (SWEET-EASE) solution 0.2-2 mL      Physical Exam - Attending Physician   GENERAL: NAD, female infant  RESPIRATORY: Chest CTA, no retractions.   CV: RRR, no murmur, good perfusion throughout.   ABDOMEN: soft, non-distended, no masses.   CNS: Normal tone for GA. AFOF. MAEE.        Communications   Parents:  Updated during rounds. See SW note for social history details.     PCPs:   Infant PCP: Loreta Us  Maternal OB PCP:   Information for the patient's mother:  Sara Trammell [[id number]]   Angel Casanova  Delivering  Provider:   Grace Marino  Admission note routed to all    Health Care Team:  Patient discussed with the care team.    A/P, imaging studies, laboratory data, medications and family situation reviewed.  Arcelia Reyes MD   [No Acute Distress] : no acute distress [Well Nourished] : well nourished [Well Developed] : well developed [Well-Appearing] : well-appearing [Normal Voice/Communication] : normal voice/communication [Normal Sclera/Conjunctiva] : normal sclera/conjunctiva [Normal Outer Ear/Nose] : the outer ears and nose were normal in appearance [Normal Oropharynx] : the oropharynx was normal [Supple] : supple [No Respiratory Distress] : no respiratory distress  [No Accessory Muscle Use] : no accessory muscle use [Clear to Auscultation] : lungs were clear to auscultation bilaterally [Normal Rate] : normal rate  [Regular Rhythm] : with a regular rhythm [Normal S1, S2] : normal S1 and S2 [Coordination Grossly Intact] : coordination grossly intact [No Focal Deficits] : no focal deficits [Normal Gait] : normal gait [Speech Grossly Normal] : speech grossly normal [Memory Grossly Normal] : memory grossly normal [Normal Affect] : the affect was normal [Alert and Oriented x3] : oriented to person, place, and time [Normal Mood] : the mood was normal [Normal Insight/Judgement] : insight and judgment were intact [de-identified] : left posterior thigh muscles tight, sl tender to deep palpation; straight leg raise negative b/l

## 2021-03-01 NOTE — HISTORY OF PRESENT ILLNESS
[FreeTextEntry8] : Rob presents states he pulled his left hamstring at gym yesterday doing leg curls. Nosebleeds x 1.5 months; states is actually some small clots in the morning when he blows his nose, not the rest of the day. This only happens in the winter, never in the summer or spring. Follows regularly with heme/onc, has blood work results for me today, but I have not received consult note as requested. No ha, blurry vision or epistaxis reported. \par \par ROS: negative except as noted above\par

## 2021-03-08 ENCOUNTER — APPOINTMENT (OUTPATIENT)
Dept: ORTHOPEDIC SURGERY | Facility: CLINIC | Age: 76
End: 2021-03-08
Payer: MEDICARE

## 2021-03-08 VITALS — HEIGHT: 72 IN | WEIGHT: 172 LBS | BODY MASS INDEX: 23.3 KG/M2

## 2021-03-08 DIAGNOSIS — S76.312A STRAIN OF MUSCLE, FASCIA AND TENDON OF THE POSTERIOR MUSCLE GROUP AT THIGH LEVEL, LEFT THIGH, INITIAL ENCOUNTER: ICD-10-CM

## 2021-03-08 PROCEDURE — 99203 OFFICE O/P NEW LOW 30 MIN: CPT

## 2021-03-08 PROCEDURE — 99072 ADDL SUPL MATRL&STAF TM PHE: CPT

## 2021-03-08 NOTE — HISTORY OF PRESENT ILLNESS
[de-identified] : MILLY TAVERAS is a 75 year male presenting to the office complaining of left lower leg pain . He  presents to the office ambulating independently. Patient reports pain began on 02/28/2021. He was performing leg curls at the gym when he felt a pull in posterior aspect of his distal hamstring.   The patient describes the pain as a dull aching, and occasionally sharp pain localized to the posterior aspect of his  left leg that is intermittent in nature. His   symptoms are exacerbated with walking.  Pain is alleviated with rest.  Patient denies instability, catching or locking of the knee. \par Patient is taking Tylenol for pain relief with moderate  relief in symptoms. \par Patient denies any other complaints at this time.

## 2021-03-08 NOTE — DISCUSSION/SUMMARY
[de-identified] : The underlying pathophysiology was reviewed in great detail with the patient as well as the various treatment options, including ice, analgesics, NSAIDs, Physical therapy, steroid injections.\par \par A home exercise sheet was given and discussed with the patient to follow.A Thera-Band was provided for exercise program. \par \par A prescription for Physical Therapy was provided.\par \par Activity modifications and restrictions were discussed. I advised avoiding deep bending, squatting and high intensity activity.\par \par FU 6 weeks. \par \par All questions were answered, all alternatives discussed and the patient is in complete agreement with that plan. Follow-up appointment as instructed. Any issues and the patient will call or come in sooner.

## 2021-03-08 NOTE — CONSULT LETTER
[Dear  ___] : Dear  [unfilled], [Consult Letter:] : I had the pleasure of evaluating your patient, [unfilled]. [Please see my note below.] : Please see my note below. [Consult Closing:] : Thank you very much for allowing me to participate in the care of this patient.  If you have any questions, please do not hesitate to contact me. [Sincerely,] : Sincerely, [FreeTextEntry3] : Dr. Pepe Ernandez \par \par

## 2021-03-08 NOTE — PHYSICAL EXAM
[de-identified] : Right Lower Extremity\par o Hip :\par ¦ Inspection/Palpation : no tenderness, no swelling, no deformities\par ¦ Range of Motion : full and painless in all planes, no crepitus\par ¦ Stability : joint stability intact\par ¦ Strength : hip flexion 5/5\par ¦ Tests and Signs : all tests for stability normal\par \par o Knee :\par ¦ Inspection/Palpation : no tenderness to palpation, no swelling, no deformity\par ¦ Range of Motion : 0 - 120 degrees, no crepitus\par ¦ Stability : no valgus or varus instability present on provocative testing, Lachman’s Test (-)\par ¦ Strength : hip flexion 5/5, adduction 5/5, gluteus medius 5/5 \par o Muscle Bulk : normal muscle bulk present\par o Skin : no erythema, no ecchymosis\par o Sensation : sensation to pin intact\par o Vascular Exam : no edema, no cyanosis, dorsalis pedis artery pulse 2+, posterior tibial artery pulse 2+\par \par Left Lower Extremity\par o Hip :\par ¦ Inspection/Palpation : no tenderness, no swelling, no deformities\par ¦ Range of Motion : full and painless in all planes, no crepitus\par ¦ Stability : joint stability intact\par ¦ Strength : hip flexion 5/5\par ¦ Tests and Signs : all tests for stability normal\par \par o Knee :\par ¦ Inspection/Palpation : distal hamstring  tenderness to palpation, mild diffuse swelling with diffuse ecchymosis posteriorly, no deformity\par ¦ Range of Motion : 0 -120 degrees, no crepitus\par ¦ Stability : no valgus or varus instability present on provocative testing, Lachman’s Test (-)\par ¦ Strength : hip flexion 5/5, with pain. \par o Muscle Bulk : normal muscle bulk present\par o Skin : no erythema, ecchymosis distal posterior thigh\par o Sensation : sensation to pin intact\par o Vascular Exam : no edema, no cyanosis, dorsalis pedis artery pulse 2+, posterior tibial artery pulse 2+

## 2021-04-28 ENCOUNTER — APPOINTMENT (OUTPATIENT)
Dept: FAMILY MEDICINE | Facility: CLINIC | Age: 76
End: 2021-04-28

## 2021-06-16 ENCOUNTER — APPOINTMENT (OUTPATIENT)
Dept: FAMILY MEDICINE | Facility: CLINIC | Age: 76
End: 2021-06-16

## 2021-06-18 ENCOUNTER — APPOINTMENT (OUTPATIENT)
Dept: FAMILY MEDICINE | Facility: CLINIC | Age: 76
End: 2021-06-18
Payer: MEDICARE

## 2021-06-18 VITALS
RESPIRATION RATE: 16 BRPM | TEMPERATURE: 97.9 F | HEART RATE: 60 BPM | OXYGEN SATURATION: 98 % | WEIGHT: 170 LBS | SYSTOLIC BLOOD PRESSURE: 150 MMHG | BODY MASS INDEX: 23.06 KG/M2 | DIASTOLIC BLOOD PRESSURE: 80 MMHG

## 2021-06-18 VITALS — SYSTOLIC BLOOD PRESSURE: 140 MMHG | DIASTOLIC BLOOD PRESSURE: 80 MMHG

## 2021-06-18 DIAGNOSIS — R23.8 OTHER SKIN CHANGES: ICD-10-CM

## 2021-06-18 PROCEDURE — 99072 ADDL SUPL MATRL&STAF TM PHE: CPT

## 2021-06-18 PROCEDURE — 99214 OFFICE O/P EST MOD 30 MIN: CPT

## 2021-06-21 PROBLEM — R23.8 EASY BRUISING: Status: ACTIVE | Noted: 2019-09-18

## 2021-06-21 NOTE — PHYSICAL EXAM
[No Acute Distress] : no acute distress [Well Nourished] : well nourished [Well Developed] : well developed [Well-Appearing] : well-appearing [Normal Voice/Communication] : normal voice/communication [Normal Sclera/Conjunctiva] : normal sclera/conjunctiva [Normal Outer Ear/Nose] : the outer ears and nose were normal in appearance [Normal Oropharynx] : the oropharynx was normal [No Lymphadenopathy] : no lymphadenopathy [Supple] : supple [No Respiratory Distress] : no respiratory distress  [No Accessory Muscle Use] : no accessory muscle use [Clear to Auscultation] : lungs were clear to auscultation bilaterally [Normal Rate] : normal rate  [Regular Rhythm] : with a regular rhythm [Normal S1, S2] : normal S1 and S2 [No Edema] : there was no peripheral edema [Speech Grossly Normal] : speech grossly normal [Memory Grossly Normal] : memory grossly normal [Normal Affect] : the affect was normal [Alert and Oriented x3] : oriented to person, place, and time [Normal Mood] : the mood was normal [Normal Insight/Judgement] : insight and judgment were intact

## 2021-06-21 NOTE — HISTORY OF PRESENT ILLNESS
[FreeTextEntry1] : Presents for f/u BP. Taking 2 BP meds, took them today. No ha, blurry vision, +some blood from nose after blowing. Taking flonase daily, helps. Has AC in home. Brilinta discussion next month with cardiologist. States easy bruising while on blood thinners.

## 2021-07-15 ENCOUNTER — RX RENEWAL (OUTPATIENT)
Age: 76
End: 2021-07-15

## 2021-07-15 RX ORDER — OIL MOISTURIZING COMB. NO.2
AEROSOL, SPRAY WITH PUMP (ML) NASAL
Qty: 1 | Refills: 1 | Status: ACTIVE | COMMUNITY
Start: 2021-06-18 | End: 1900-01-01

## 2021-09-05 ENCOUNTER — RX RENEWAL (OUTPATIENT)
Age: 76
End: 2021-09-05

## 2021-11-11 ENCOUNTER — APPOINTMENT (OUTPATIENT)
Dept: FAMILY MEDICINE | Facility: CLINIC | Age: 76
End: 2021-11-11

## 2021-11-19 ENCOUNTER — APPOINTMENT (OUTPATIENT)
Dept: FAMILY MEDICINE | Facility: CLINIC | Age: 76
End: 2021-11-19
Payer: MEDICARE

## 2021-11-19 VITALS
RESPIRATION RATE: 16 BRPM | OXYGEN SATURATION: 98 % | HEIGHT: 72 IN | BODY MASS INDEX: 23.16 KG/M2 | WEIGHT: 171 LBS | HEART RATE: 62 BPM | TEMPERATURE: 98 F | SYSTOLIC BLOOD PRESSURE: 120 MMHG | DIASTOLIC BLOOD PRESSURE: 80 MMHG

## 2021-11-19 DIAGNOSIS — R04.0 EPISTAXIS: ICD-10-CM

## 2021-11-19 PROCEDURE — 99214 OFFICE O/P EST MOD 30 MIN: CPT

## 2021-11-19 NOTE — PHYSICAL EXAM
[No Acute Distress] : no acute distress [Well Nourished] : well nourished [Well Developed] : well developed [Well-Appearing] : well-appearing [Normal Voice/Communication] : normal voice/communication [Normal Sclera/Conjunctiva] : normal sclera/conjunctiva [Normal Outer Ear/Nose] : the outer ears and nose were normal in appearance [Normal TMs] : both tympanic membranes were normal [No Lymphadenopathy] : no lymphadenopathy [Supple] : supple [No Respiratory Distress] : no respiratory distress  [No Accessory Muscle Use] : no accessory muscle use [Clear to Auscultation] : lungs were clear to auscultation bilaterally [Normal Rate] : normal rate  [Regular Rhythm] : with a regular rhythm [Normal S1, S2] : normal S1 and S2 [de-identified] : small vein overlying tendon of 3rd digit in palm

## 2021-11-19 NOTE — HISTORY OF PRESENT ILLNESS
[FreeTextEntry8] : Declines CPE today, states today is not a good day, states will reschedule. Left hand has a palmar lesion x 1 year. Right handed. Weight lifs with dumbbells with gloves. Not itchy, not painful. Also started recently having scant amount of blood from nose in mornings, occasionally in afternoon. This happens yearly with cold weather/dry heat. Recently saw hematologist. Platelets were good as per heme/onc, did not bring blood work. Has seen ENT, Dr. Boone in the past for ear complaint, never for epistaxis.\par \par ROS: negative except as noted above\par

## 2021-11-19 NOTE — PHYSICAL EXAM
[No Acute Distress] : no acute distress [Well Nourished] : well nourished [Well Developed] : well developed [Well-Appearing] : well-appearing [Normal Voice/Communication] : normal voice/communication [Normal Sclera/Conjunctiva] : normal sclera/conjunctiva [Normal Outer Ear/Nose] : the outer ears and nose were normal in appearance [Normal TMs] : both tympanic membranes were normal [No Lymphadenopathy] : no lymphadenopathy [Supple] : supple [No Respiratory Distress] : no respiratory distress  [No Accessory Muscle Use] : no accessory muscle use [Clear to Auscultation] : lungs were clear to auscultation bilaterally [Normal Rate] : normal rate  [Regular Rhythm] : with a regular rhythm [Normal S1, S2] : normal S1 and S2 [de-identified] : small vein overlying tendon of 3rd digit in palm

## 2021-12-02 ENCOUNTER — APPOINTMENT (OUTPATIENT)
Dept: FAMILY MEDICINE | Facility: CLINIC | Age: 76
End: 2021-12-02
Payer: MEDICARE

## 2021-12-02 VITALS — TEMPERATURE: 97.8 F

## 2021-12-02 DIAGNOSIS — Z23 ENCOUNTER FOR IMMUNIZATION: ICD-10-CM

## 2021-12-02 PROCEDURE — 0064A: CPT

## 2021-12-03 PROBLEM — Z23 ENCOUNTER FOR IMMUNIZATION: Status: ACTIVE | Noted: 2020-12-30

## 2022-03-11 ENCOUNTER — APPOINTMENT (OUTPATIENT)
Dept: OTOLARYNGOLOGY | Facility: CLINIC | Age: 77
End: 2022-03-11
Payer: MEDICARE

## 2022-03-11 VITALS
DIASTOLIC BLOOD PRESSURE: 75 MMHG | BODY MASS INDEX: 23.03 KG/M2 | HEIGHT: 72 IN | HEART RATE: 52 BPM | SYSTOLIC BLOOD PRESSURE: 121 MMHG | TEMPERATURE: 97.3 F | OXYGEN SATURATION: 97 % | WEIGHT: 170 LBS

## 2022-03-11 DIAGNOSIS — R09.81 NASAL CONGESTION: ICD-10-CM

## 2022-03-11 DIAGNOSIS — J34.2 DEVIATED NASAL SEPTUM: ICD-10-CM

## 2022-03-11 DIAGNOSIS — J34.3 HYPERTROPHY OF NASAL TURBINATES: ICD-10-CM

## 2022-03-11 PROCEDURE — 31231 NASAL ENDOSCOPY DX: CPT

## 2022-03-11 PROCEDURE — 99213 OFFICE O/P EST LOW 20 MIN: CPT | Mod: 25

## 2022-03-11 NOTE — PROCEDURE
[FreeTextEntry6] : Procedure performed: Nasal Endoscopy- Diagnostic\par \par Pre-op indication(s): Nasal obstruction\par Post-op indication(s): Same\par \par Verbal and/or written consent obtained from patient\par \par “Anterior rhinoscopy insufficient to account for symptoms”\par \par Details for procedure:\par Scope #: G8\par Type of scope: Flex\par \par Anesthesia: 4% Lidocaine and Afrin\par \par The following anatomic sites were directly examined in a sequential fashion:\par The scope was introduced in the nasal passage between the middle and inferior turbinates to exam the inferior portion of the middle meatus and the fontanelle, as well as the maxillary ostia.  Next, the scope was passed medially and posteriorly to the middle turbinates to examine the sphenoethmoid recess and the superior turbinate region.\par \par Upon visualization the findings are as follows:\par \par Nasal Septum:\par            S-shaped deviation obstructing both nasal passages.  \par            \par Right Side:\par ·	Mucosa: Normal\par ·	Mucous: Normal\par ·	Polyp: No\par ·	Inferior Turbinate: Hypertrophic\par ·	Middle Turbinate: Normal\par ·	Superior Turbinate: Normal\par ·	Inferior Meatus: Normal\par ·	Middle Meatus: Normal\par ·	Superior Meatus: Normal\par ·	Sphenoethmoidal Recess: Normal\par \par Left Side:\par ·	Mucosa: Normal\par ·	Mucous: Normal\par ·	Polyp: No\par ·	Inferior Turbinate: Hypertrophic\par ·	Middle Turbinate: Normal\par ·	Superior Turbinate: Normal\par ·	Inferior Meatus: Normal\par ·	Middle Meatus: Normal\par ·	Superior Meatus: Normal\par ·	Sphenoethmoidal Recess: Normal\par \par \par The patient tolerated the procedure well without any complications. \par \par

## 2022-03-11 NOTE — PHYSICAL EXAM
[Midline] : trachea located in midline position [Normal] : no rashes [] : septum deviated bilaterally [de-identified] : Hypertrophic

## 2022-03-11 NOTE — ASSESSMENT
[FreeTextEntry1] : Pt not getting relief from Fluticasone spray.  His only other option would be to consider doing a septoplasty and turbinate reduction procedure.  Pt would need to come off anticoagulation for 14 days for this procedure.  He will consider this option and discuss the anticoagulation issue with his Cardiologist.

## 2022-03-11 NOTE — HISTORY OF PRESENT ILLNESS
[Clear Rhinorrhea] : clear rhinorrhea [Nasal Congestion] : nasal congestion [de-identified] : Mr. TAVERAS is a 76 year male who presents with nasal congestion.  He reports that from the time he wakes up, he has been having nasal congestion, R>L.  He reports having this congestion for at least a year.  He reported to his PCP, Dr. Santana who prescribed Flonase of which he has been using for over a year with very little relief.  No imaging. [Facial Pain] : no facial pain [Headache] : no headache [Facial Pressure] : no facial pressure [Postnasal Drainage] : no postnasal drainage

## 2022-03-11 NOTE — CONSULT LETTER
[Dear  ___] : Dear  [unfilled], [Courtesy Letter:] : I had the pleasure of seeing your patient, [unfilled], in my office today. [Please see my note below.] : Please see my note below. [Consult Closing:] : Thank you very much for allowing me to participate in the care of this patient.  If you have any questions, please do not hesitate to contact me. [Sincerely,] : Sincerely, [DrOli  ___] : Dr. LINDSAY [FreeTextEntry2] : Joey Santana MD ([facility] physician)\par  [FreeTextEntry3] : Camden Boone MD, FACS\par Chief of Otolaryngology [facility]\par  - Dept. of Otolaryngology\par [facility] [employer]\par \par

## 2022-05-16 ENCOUNTER — APPOINTMENT (OUTPATIENT)
Dept: ORTHOPEDIC SURGERY | Facility: CLINIC | Age: 77
End: 2022-05-16
Payer: MEDICARE

## 2022-05-16 VITALS — HEIGHT: 72 IN | BODY MASS INDEX: 23.3 KG/M2 | WEIGHT: 172 LBS

## 2022-05-16 PROCEDURE — 73030 X-RAY EXAM OF SHOULDER: CPT | Mod: RT

## 2022-05-16 PROCEDURE — 99214 OFFICE O/P EST MOD 30 MIN: CPT

## 2022-05-16 NOTE — HISTORY OF PRESENT ILLNESS
[de-identified] : MILLY TAVERAS is a 75 year male presenting to the office complaining of new right shoulder pain. Denies injury. He has a history of shoulder arthritis. He denies any specific pain. He notes there is a "grinding and crunching" sensation with movement of the shoulder.

## 2022-05-16 NOTE — DISCUSSION/SUMMARY
[de-identified] : The underlying pathophysiology was reviewed in great detail with the patient as well as the various treatment options, including ice, analgesics, NSAIDs, Physical therapy, steroid injections, viscosupplementation, rTSR\par \par Activity modifications and restrictions were discussed. I advised avoiding overhead lifting. I advised the patient to work on good posture. \par \par A home exercise sheet was given and discussed with the patient to follow. 	\par \par We will seek authorization for right shoulder gel injections. Once we receive authorization, we will proceed accordingly. 	 \par \par Patient may follow up if their symptoms worsen in the future. \par \par All questions were answered, all alternatives discussed and the patient is in complete agreement with that plan. Follow-up appointment as instructed. Any issues and the patient will call or come in sooner.

## 2022-05-16 NOTE — END OF VISIT
[FreeTextEntry3] : All medical record entries made by the Scribe were at my, Dr. Pepe Ernandez, direction and personally dictated by me on 05/16/2022. I have reviewed the chart and agree that the record accurately reflects my personal performance of the history, physical exam, assessment and plan. I have also personally directed, reviewed, and agreed with the chart.

## 2022-05-16 NOTE — ADDENDUM
[FreeTextEntry1] : I, Kyler Villegas, acted solely as a scribe for Dr. Pepe Ernandez on this date 05/16/2022.

## 2022-05-16 NOTE — PHYSICAL EXAM
[Normal RUE] : Right Upper Extremity: No scars, rashes, lesions, ulcers, skin intact [Normal LUE] : Left Upper Extremity: No scars, rashes, lesions, ulcers, skin intact [Normal Touch] : sensation intact for touch [Normal] : Alert and in no acute distress [de-identified] : Right Upper Extremity\par o Shoulder :\par ¦ Inspection/Palpation : no tenderness, no swelling, no deformities\par ¦ Range of Motion : ACTIVE FORWARD ELEVATION: Measured at 140 degrees, ACTIVE EXTERNAL ROTATION: Measured at 50 degrees, ACTIVE INTERNAL ROTATION: Measured at T10\par ¦ Strength : external rotation 5/5, internal rotation 5/5, supraspinatus 5/5\par ¦ Stability : no joint instability on provocative testing\par ¦ Tests/Signs : Neer (-), Myers (-)\par o Upper Arm : no tenderness, no swelling, no deformities\par o Muscle Bulk : no atrophy\par o Sensation : sensation intact to light touch\par o Skin : no skin rash or discoloration\par o Vascular Exam : no edema, no cyanosis, radial and ulnar pulses normal [de-identified] : o Right Shoulder : Grashey, Axillary, and Outlet views were obtained, there are no soft tissue abnormalities, no fractures, alignment is normal, advanced osteoarthritis with bone on bone apposition of the glenohumeral joint, osteophyte formation, normal bone density, no bony lesions.

## 2022-05-18 ENCOUNTER — FORM ENCOUNTER (OUTPATIENT)
Age: 77
End: 2022-05-18

## 2022-06-02 ENCOUNTER — APPOINTMENT (OUTPATIENT)
Dept: ORTHOPEDIC SURGERY | Facility: CLINIC | Age: 77
End: 2022-06-02
Payer: MEDICARE

## 2022-06-16 ENCOUNTER — APPOINTMENT (OUTPATIENT)
Dept: ORTHOPEDIC SURGERY | Facility: CLINIC | Age: 77
End: 2022-06-16
Payer: MEDICARE

## 2022-06-16 VITALS — BODY MASS INDEX: 23.3 KG/M2 | HEIGHT: 72 IN | WEIGHT: 172 LBS

## 2022-06-16 PROCEDURE — 99214 OFFICE O/P EST MOD 30 MIN: CPT | Mod: 25

## 2022-06-16 PROCEDURE — 20611 DRAIN/INJ JOINT/BURSA W/US: CPT | Mod: RT

## 2022-06-16 NOTE — PROCEDURE
[de-identified] : At this point I recommended a therapeutic injection and under sterile precautions with US guidance, an injection of 6 cc of Synvisc One (Lot: [id number] , Expiration: 7/2024) -was placed into the glenohumeral joint of the Right shoulder without complication, and after several minutes, the patient felt significant relief.

## 2022-06-16 NOTE — HISTORY OF PRESENT ILLNESS
[de-identified] : Mr. MILLY TAVERAS  is a 76 year old male who presents to the office for a follow-up visit.  He is here for an injection for his right shoulder. He does not have any pain.  It is more a sensation of grinding.

## 2022-06-16 NOTE — PHYSICAL EXAM
[Normal RUE] : Right Upper Extremity: No scars, rashes, lesions, ulcers, skin intact [Normal LUE] : Left Upper Extremity: No scars, rashes, lesions, ulcers, skin intact [Normal Touch] : sensation intact for touch [Normal] : Alert and in no acute distress [de-identified] : Right Upper Extremity\par o Shoulder :\par ¦ Inspection/Palpation : no tenderness, no swelling, no deformities\par ¦ Range of Motion : ACTIVE FORWARD ELEVATION: Measured at 140 degrees, ACTIVE EXTERNAL ROTATION: Measured at 50 degrees, ACTIVE INTERNAL ROTATION: Measured at T10\par ¦ Strength : external rotation 5/5, internal rotation 5/5, supraspinatus 5/5\par ¦ Stability : no joint instability on provocative testing\par ¦ Tests/Signs : Neer (-), Myers (-)\par o Upper Arm : no tenderness, no swelling, no deformities\par o Muscle Bulk : no atrophy\par o Sensation : sensation intact to light touch\par o Skin : no skin rash or discoloration\par o Vascular Exam : no edema, no cyanosis, radial and ulnar pulses normal

## 2022-06-16 NOTE — DISCUSSION/SUMMARY
[de-identified] : The underlying pathophysiology was reviewed in great detail with the patient as well as the various treatment options, including ice, analgesics, NSAIDs, Physical therapy, steroid injections, viscosupplementation, rTSR\par \par Activity modifications and restrictions were discussed. I advised avoiding overhead lifting. I advised the patient to work on good posture. \par \par A home exercise sheet was given and discussed with the patient to follow. 	\par 	 \par The patient elected to receive a Synvisc One injection into his right shoulder today and tolerated it well. I instructed the patient on ROM exercises, and told them to take it easy. The use of ice and rest was reviewed with the patient. The patient may resume activities in a couple of days. I reminded the patient that it takes 4 to 6 weeks after the injection to feel symptom relief \par \par He may return in 6 months for another series of gel injections as per insurance guidelines. A cortisone injection may be administered during the interim period if he cannot tolerate such a wait. \par \par All questions were answered, all alternatives discussed and the patient is in complete agreement with that plan. Follow-up appointment as instructed. Any issues and the patient will call or come in sooner.

## 2022-06-16 NOTE — ADDENDUM
[FreeTextEntry1] : I, Parul Badillo , acted solely as a scribe for Dr. Pepe Ernandez on this date 06/16/2022.

## 2022-08-08 ENCOUNTER — RX RENEWAL (OUTPATIENT)
Age: 77
End: 2022-08-08

## 2022-10-08 ENCOUNTER — NON-APPOINTMENT (OUTPATIENT)
Age: 77
End: 2022-10-08

## 2022-10-12 ENCOUNTER — NON-APPOINTMENT (OUTPATIENT)
Age: 77
End: 2022-10-12

## 2023-04-21 ENCOUNTER — APPOINTMENT (OUTPATIENT)
Dept: FAMILY MEDICINE | Facility: CLINIC | Age: 78
End: 2023-04-21

## 2023-04-27 ENCOUNTER — APPOINTMENT (OUTPATIENT)
Dept: ORTHOPEDIC SURGERY | Facility: CLINIC | Age: 78
End: 2023-04-27
Payer: MEDICARE

## 2023-04-27 VITALS — WEIGHT: 172 LBS | BODY MASS INDEX: 23.3 KG/M2 | HEIGHT: 72 IN

## 2023-04-27 PROCEDURE — 99214 OFFICE O/P EST MOD 30 MIN: CPT

## 2023-05-02 ENCOUNTER — FORM ENCOUNTER (OUTPATIENT)
Age: 78
End: 2023-05-02

## 2023-05-22 ENCOUNTER — APPOINTMENT (OUTPATIENT)
Dept: ORTHOPEDIC SURGERY | Facility: CLINIC | Age: 78
End: 2023-05-22
Payer: MEDICARE

## 2023-05-22 PROCEDURE — 20611 DRAIN/INJ JOINT/BURSA W/US: CPT | Mod: RT

## 2023-06-21 ENCOUNTER — APPOINTMENT (OUTPATIENT)
Dept: FAMILY MEDICINE | Facility: CLINIC | Age: 78
End: 2023-06-21
Payer: MEDICARE

## 2023-06-21 VITALS
TEMPERATURE: 97.6 F | SYSTOLIC BLOOD PRESSURE: 131 MMHG | HEIGHT: 72 IN | OXYGEN SATURATION: 98 % | RESPIRATION RATE: 16 BRPM | BODY MASS INDEX: 23.3 KG/M2 | WEIGHT: 172 LBS | HEART RATE: 61 BPM | DIASTOLIC BLOOD PRESSURE: 79 MMHG

## 2023-06-21 DIAGNOSIS — Z86.69 PERSONAL HISTORY OF OTHER DISEASES OF THE NERVOUS SYSTEM AND SENSE ORGANS: ICD-10-CM

## 2023-06-21 DIAGNOSIS — M25.512 PAIN IN LEFT SHOULDER: ICD-10-CM

## 2023-06-21 PROCEDURE — 99397 PER PM REEVAL EST PAT 65+ YR: CPT

## 2023-06-23 PROBLEM — M25.512 LEFT SHOULDER PAIN: Status: ACTIVE | Noted: 2017-09-19

## 2023-06-23 NOTE — HISTORY OF PRESENT ILLNESS
[de-identified] : 77 year old male presents for health maintenance and physical examination. He has acute back pain, worst in the mornings, for month and a half. Denies numbness, tingling, shooting pains, saddle anesthesia, or bowel-bladder incontinence. He exercises often, typically weight lifts light weights, has been seeing Dr. Ernandez for bilateral shoulder pain and grinding, currently receiving Simvisc injections. 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 He follows up yearly with neurology and is adherent to his Keppra regimen. He feels his seizures could have been due to a pre-workout he used to use with [employer], but he has not used that in decades and does not want to stop the medication because "if it ain't broke, don't fix it". 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 He has intermittent right ear diplacusis for the past few years which often resolves when putting his head down. He has allergy to lilac which causes rhinorrhea. He is a former smoker, 45 years smoke free, no alcohol or drugs. He wishes to defer colon cancer screening at this time.

## 2023-08-20 ENCOUNTER — RX RENEWAL (OUTPATIENT)
Age: 78
End: 2023-08-20

## 2023-08-20 RX ORDER — FLUTICASONE PROPIONATE 50 UG/1
50 SPRAY, METERED NASAL
Qty: 16 | Refills: 11 | Status: ACTIVE | COMMUNITY
Start: 2020-03-04 | End: 1900-01-01

## 2023-10-23 ENCOUNTER — APPOINTMENT (OUTPATIENT)
Dept: FAMILY MEDICINE | Facility: CLINIC | Age: 78
End: 2023-10-23
Payer: MEDICARE

## 2023-10-23 VITALS
WEIGHT: 168 LBS | SYSTOLIC BLOOD PRESSURE: 131 MMHG | HEART RATE: 59 BPM | OXYGEN SATURATION: 99 % | RESPIRATION RATE: 14 BRPM | TEMPERATURE: 96 F | DIASTOLIC BLOOD PRESSURE: 80 MMHG | BODY MASS INDEX: 22.79 KG/M2

## 2023-10-23 DIAGNOSIS — D47.3 ESSENTIAL (HEMORRHAGIC) THROMBOCYTHEMIA: ICD-10-CM

## 2023-10-23 DIAGNOSIS — M54.50 LOW BACK PAIN, UNSPECIFIED: ICD-10-CM

## 2023-10-23 PROCEDURE — 99214 OFFICE O/P EST MOD 30 MIN: CPT

## 2023-10-23 RX ORDER — TICAGRELOR 60 MG/1
60 TABLET ORAL TWICE DAILY
Qty: 60 | Refills: 0 | Status: DISCONTINUED | COMMUNITY
Start: 2020-11-02 | End: 2023-10-23

## 2023-10-24 PROBLEM — M54.50 LOW BACK PAIN: Status: ACTIVE | Noted: 2017-09-19

## 2023-10-24 PROBLEM — D47.3 ESSENTIAL THROMBOCYTHEMIA: Status: ACTIVE | Noted: 2019-07-10

## 2023-12-04 ENCOUNTER — APPOINTMENT (OUTPATIENT)
Dept: ORTHOPEDIC SURGERY | Facility: CLINIC | Age: 78
End: 2023-12-04
Payer: MEDICARE

## 2023-12-04 VITALS — BODY MASS INDEX: 23.03 KG/M2 | HEIGHT: 72 IN | WEIGHT: 170 LBS

## 2023-12-04 DIAGNOSIS — M25.561 PAIN IN RIGHT KNEE: ICD-10-CM

## 2023-12-04 PROCEDURE — 99214 OFFICE O/P EST MOD 30 MIN: CPT

## 2023-12-04 PROCEDURE — 73564 X-RAY EXAM KNEE 4 OR MORE: CPT | Mod: RT

## 2024-05-06 ENCOUNTER — APPOINTMENT (OUTPATIENT)
Dept: FAMILY MEDICINE | Facility: CLINIC | Age: 79
End: 2024-05-06
Payer: MEDICARE

## 2024-05-06 VITALS
SYSTOLIC BLOOD PRESSURE: 112 MMHG | WEIGHT: 171 LBS | HEART RATE: 67 BPM | TEMPERATURE: 98.3 F | BODY MASS INDEX: 23.19 KG/M2 | DIASTOLIC BLOOD PRESSURE: 67 MMHG | OXYGEN SATURATION: 95 % | RESPIRATION RATE: 14 BRPM

## 2024-05-06 DIAGNOSIS — M19.011 PRIMARY OSTEOARTHRITIS, RIGHT SHOULDER: ICD-10-CM

## 2024-05-06 DIAGNOSIS — I10 ESSENTIAL (PRIMARY) HYPERTENSION: ICD-10-CM

## 2024-05-06 DIAGNOSIS — D75.89 OTHER SPECIFIED DISEASES OF BLOOD AND BLOOD-FORMING ORGANS: ICD-10-CM

## 2024-05-06 DIAGNOSIS — Z85.46 PERSONAL HISTORY OF MALIGNANT NEOPLASM OF PROSTATE: ICD-10-CM

## 2024-05-06 PROCEDURE — G2211 COMPLEX E/M VISIT ADD ON: CPT

## 2024-05-06 PROCEDURE — 99214 OFFICE O/P EST MOD 30 MIN: CPT

## 2024-05-08 PROBLEM — M19.011 PRIMARY OSTEOARTHRITIS OF RIGHT SHOULDER: Status: ACTIVE | Noted: 2022-05-16

## 2024-05-08 PROBLEM — D75.89 MACROCYTOSIS: Status: ACTIVE | Noted: 2023-10-24

## 2024-05-08 NOTE — HISTORY OF PRESENT ILLNESS
[de-identified] : 78-year-old male presenting for ongoing management of chronic medical conditions and evaluation of right shoulder pain. Chronic shoulder pain, dislocated 30 years ago. Annoying pain in the right shoulder, worse with incline bench press exercises. Incline bench press exercises aggravates. Flat bench press exercises are okay, avoiding aggravating activities helps. Previous Synvisc injections in the right shoulder with good relief, but insurance denied further injections. Past Medical History of Prostate cancer treated with proton therapy 20 years ago. Goes to the [employer] gym, uses hemp lotion on the shoulder. Medications : Low-dose aspirin daily, carvedilol 3.125 mg twice daily, hydroxyurea 500 mg Monday-Thursday, candesartan 16 mg daily, atorvastatin 40 mg daily, levetiracetam 750 mg twice daily, daily multivitamin. Recent CBC, metabolic panel, LDH, and uric acid from April 23rd visit with hematologist showed well-controlled platelet count and macrocytosis expected from hydroxyurea.    The plan includes ordering additional blood work, referring for physical therapy for the right shoulder, and considering topical CBD for pain management. - Plan : - Order blood work: CBC, metabolic panel, lipid panel, B12, folate, thyroid studies, and PSA.  - Refer to physical therapy for evaluation and treatment of right shoulder pain due to glenohumeral osteoarthritis.  - Consider topical CBD for pain management of right shoulder osteoarthritis.  - Follow up in 1-2 months to review blood work results and reassess shoulder pain.

## 2024-05-08 NOTE — PHYSICAL EXAM
[Normal] : normal rate, regular rhythm, normal S1 and S2 and no murmur heard [de-identified] : Right shoulder examination revealed limited range of motion, pain with abduction and external rotation

## 2024-05-28 ENCOUNTER — APPOINTMENT (OUTPATIENT)
Dept: ORTHOPEDIC SURGERY | Facility: CLINIC | Age: 79
End: 2024-05-28
Payer: MEDICARE

## 2024-05-28 DIAGNOSIS — M17.12 UNILATERAL PRIMARY OSTEOARTHRITIS, LEFT KNEE: ICD-10-CM

## 2024-05-28 PROCEDURE — 73564 X-RAY EXAM KNEE 4 OR MORE: CPT | Mod: LT

## 2024-05-28 PROCEDURE — 99204 OFFICE O/P NEW MOD 45 MIN: CPT

## 2024-05-28 NOTE — PHYSICAL EXAM
[Left] : left knee [NL (0)] : extension 0 degrees [5___] : hamstring 5[unfilled]/5 [Negative] : negative Cullen's [] : negative Lachmann [[id number]] : flexion 135 degrees

## 2024-05-28 NOTE — DISCUSSION/SUMMARY
[de-identified] : 79yo M with left knee OA  1) HEP provided  2) cryotherapy, rest and activity modification 3) discussed CSI vs gel injections if pain persists  4) rtc prn

## 2024-05-28 NOTE — HISTORY OF PRESENT ILLNESS
[0] : 0 [Intermittent] : intermittent [de-identified] : 05/28/24 - pt is here for left knee, states he was getting off a boat 05/22/24 states he twisted his knee a funny way, and began popping noises discontinued 05/25/24 no pain, no more popping. slight discomfort.    [] : no [FreeTextEntry5] : 05/28/24 - pt is here for left knee, states he was getting off a boat 05/22/24 states he twisted his knee a funny way, and began popping noises discontinued 05/25/24  no pain, no more popping. slight discomfort.

## 2024-05-30 ENCOUNTER — APPOINTMENT (OUTPATIENT)
Dept: ORTHOPEDIC SURGERY | Facility: CLINIC | Age: 79
End: 2024-05-30

## 2024-06-24 ENCOUNTER — NON-APPOINTMENT (OUTPATIENT)
Age: 79
End: 2024-06-24

## 2024-07-30 ENCOUNTER — LABORATORY RESULT (OUTPATIENT)
Age: 79
End: 2024-07-30

## 2024-08-05 ENCOUNTER — APPOINTMENT (OUTPATIENT)
Dept: FAMILY MEDICINE | Facility: CLINIC | Age: 79
End: 2024-08-05

## 2024-08-05 PROCEDURE — 99214 OFFICE O/P EST MOD 30 MIN: CPT

## 2024-08-05 PROCEDURE — G2211 COMPLEX E/M VISIT ADD ON: CPT

## 2024-08-07 NOTE — HISTORY OF PRESENT ILLNESS
[de-identified] : 78-year-old male, presented for evaluation of ear problems and to discuss recent blood work results. PMH of echoing sound in the ear for the past 10 years. Previously seen by an ENT doctor (Dr. Boone) in 2022 for nasal congestion, which was worse on the right side and had been present for at least a year. Was prescribed Flonase at that time, but experienced little relief. A septoplasty and turbinate reduction procedure were recommended, but the patient did not follow up. History of essential thrombocythemia diagnosed around 5 years ago. Hospitalized for 15 days due to heart issues related to high platelet count after working out. Underwent a procedure (possibly apheresis) to lower platelet count. Currently taking hydroxyurea (500 mg twice daily, Monday through Friday) to manage ET. Past Surgical History: Proton treatment for prostate cancer approximately 20 years ago. Blood work showed an elevated mean corpuscular volume 105 and 111, B12 and folate levels were normal. 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

## 2025-05-19 ENCOUNTER — APPOINTMENT (OUTPATIENT)
Dept: ORTHOPEDIC SURGERY | Facility: CLINIC | Age: 80
End: 2025-05-19
Payer: MEDICARE

## 2025-05-19 VITALS — HEIGHT: 72 IN | WEIGHT: 172 LBS | BODY MASS INDEX: 23.3 KG/M2

## 2025-05-19 DIAGNOSIS — S76.111A STRAIN OF RIGHT QUADRICEPS MUSCLE, FASCIA AND TENDON, INITIAL ENCOUNTER: ICD-10-CM

## 2025-05-19 DIAGNOSIS — M25.561 PAIN IN RIGHT KNEE: ICD-10-CM

## 2025-05-19 PROCEDURE — 73564 X-RAY EXAM KNEE 4 OR MORE: CPT | Mod: RT

## 2025-05-19 PROCEDURE — 99214 OFFICE O/P EST MOD 30 MIN: CPT
